# Patient Record
Sex: FEMALE | Race: BLACK OR AFRICAN AMERICAN | NOT HISPANIC OR LATINO | URBAN - METROPOLITAN AREA
[De-identification: names, ages, dates, MRNs, and addresses within clinical notes are randomized per-mention and may not be internally consistent; named-entity substitution may affect disease eponyms.]

---

## 2022-04-27 ENCOUNTER — APPOINTMENT (RX ONLY)
Dept: URBAN - METROPOLITAN AREA CLINIC 34 | Facility: CLINIC | Age: 32
Setting detail: DERMATOLOGY
End: 2022-04-27

## 2022-04-27 DIAGNOSIS — L30.0 NUMMULAR DERMATITIS: ICD-10-CM | Status: INADEQUATELY CONTROLLED

## 2022-04-27 DIAGNOSIS — L70.0 ACNE VULGARIS: ICD-10-CM | Status: INADEQUATELY CONTROLLED

## 2022-04-27 PROBLEM — L30.9 DERMATITIS, UNSPECIFIED: Status: ACTIVE | Noted: 2022-04-27

## 2022-04-27 PROBLEM — D23.61 OTHER BENIGN NEOPLASM OF SKIN OF RIGHT UPPER LIMB, INCLUDING SHOULDER: Status: ACTIVE | Noted: 2022-04-27

## 2022-04-27 PROCEDURE — ? CONSULTATION FOR EXCISION

## 2022-04-27 PROCEDURE — ? PRESCRIPTION

## 2022-04-27 PROCEDURE — ? COUNSELING

## 2022-04-27 PROCEDURE — ? ADDITIONAL NOTES

## 2022-04-27 PROCEDURE — ? PRESCRIPTION MEDICATION MANAGEMENT

## 2022-04-27 PROCEDURE — 99204 OFFICE O/P NEW MOD 45 MIN: CPT

## 2022-04-27 RX ORDER — CLINDAMYCIN PHOSPHATE AND BENZOYL PEROXIDE 12; 50 MG/G; MG/G
GEL TOPICAL QAM
Qty: 45 | Refills: 2 | Status: ERX | COMMUNITY
Start: 2022-04-27

## 2022-04-27 RX ORDER — MOMETASONE FUROATE 1 MG/G
OINTMENT TOPICAL BID
Qty: 45 | Refills: 2 | Status: ERX | COMMUNITY
Start: 2022-04-27

## 2022-04-27 RX ORDER — TRETIONIN 0.25 MG/G
CREAM TOPICAL QHS
Qty: 45 | Refills: 2 | Status: ERX | COMMUNITY
Start: 2022-04-27

## 2022-04-27 RX ORDER — SPIRONOLACTONE 100 MG/1
TABLET, FILM COATED ORAL
Qty: 90 | Refills: 0 | Status: ERX | COMMUNITY
Start: 2022-04-27

## 2022-04-27 RX ADMIN — MOMETASONE FUROATE: 1 OINTMENT TOPICAL at 00:00

## 2022-04-27 RX ADMIN — SPIRONOLACTONE: 100 TABLET, FILM COATED ORAL at 00:00

## 2022-04-27 RX ADMIN — TRETIONIN: 0.25 CREAM TOPICAL at 00:00

## 2022-04-27 RX ADMIN — CLINDAMYCIN PHOSPHATE AND BENZOYL PEROXIDE: 12; 50 GEL TOPICAL at 00:00

## 2022-04-27 ASSESSMENT — LOCATION SIMPLE DESCRIPTION DERM
LOCATION SIMPLE: LEFT CHEEK
LOCATION SIMPLE: RIGHT CHEEK
LOCATION SIMPLE: RIGHT PRETIBIAL REGION
LOCATION SIMPLE: LEFT PRETIBIAL REGION

## 2022-04-27 ASSESSMENT — LOCATION ZONE DERM
LOCATION ZONE: FACE
LOCATION ZONE: LEG

## 2022-04-27 ASSESSMENT — LOCATION DETAILED DESCRIPTION DERM
LOCATION DETAILED: RIGHT INFERIOR CENTRAL MALAR CHEEK
LOCATION DETAILED: RIGHT PROXIMAL PRETIBIAL REGION
LOCATION DETAILED: LEFT PROXIMAL PRETIBIAL REGION
LOCATION DETAILED: LEFT INFERIOR CENTRAL MALAR CHEEK

## 2022-04-27 ASSESSMENT — SEVERITY ASSESSMENT: SEVERITY: MODERATE

## 2022-04-27 NOTE — PROCEDURE: COUNSELING
Bactrim Pregnancy And Lactation Text: This medication is Pregnancy Category D and is known to cause fetal risk.  It is also excreted in breast milk.
Azelaic Acid Pregnancy And Lactation Text: This medication is considered safe during pregnancy and breast feeding.
Doxycycline Counseling:  Patient counseled regarding possible photosensitivity and increased risk for sunburn.  Patient instructed to avoid sunlight, if possible.  When exposed to sunlight, patients should wear protective clothing, sunglasses, and sunscreen.  The patient was instructed to call the office immediately if the following severe adverse effects occur:  hearing changes, easy bruising/bleeding, severe headache, or vision changes.  The patient verbalized understanding of the proper use and possible adverse effects of doxycycline.  All of the patient's questions and concerns were addressed.
Detail Level: Detailed
Topical Retinoid counseling:  Patient advised to apply a pea-sized amount only at bedtime and wait 30 minutes after washing their face before applying.  If too drying, patient may add a non-comedogenic moisturizer. The patient verbalized understanding of the proper use and possible adverse effects of retinoids.  All of the patient's questions and concerns were addressed.
Minocycline Pregnancy And Lactation Text: This medication is Pregnancy Category D and not consider safe during pregnancy. It is also excreted in breast milk.
Topical Clindamycin Counseling: Patient counseled that this medication may cause skin irritation or allergic reactions.  In the event of skin irritation, the patient was advised to reduce the amount of the drug applied or use it less frequently.   The patient verbalized understanding of the proper use and possible adverse effects of clindamycin.  All of the patient's questions and concerns were addressed.
Spironolactone Pregnancy And Lactation Text: This medication can cause feminization of the male fetus and should be avoided during pregnancy. The active metabolite is also found in breast milk.
Isotretinoin Counseling: Patient should get monthly blood tests, not donate blood, not drive at night if vision affected, not share medication, and not undergo elective surgery for 6 months after tx completed. Side effects reviewed, pt to contact office should one occur.
Doxycycline Pregnancy And Lactation Text: This medication is Pregnancy Category D and not consider safe during pregnancy. It is also excreted in breast milk but is considered safe for shorter treatment courses.
Azithromycin Counseling:  I discussed with the patient the risks of azithromycin including but not limited to GI upset, allergic reaction, drug rash, diarrhea, and yeast infections.
Topical Retinoid Pregnancy And Lactation Text: This medication is Pregnancy Category C. It is unknown if this medication is excreted in breast milk.
Sarecycline Counseling: Patient advised regarding possible photosensitivity and discoloration of the teeth, skin, lips, tongue and gums.  Patient instructed to avoid sunlight, if possible.  When exposed to sunlight, patients should wear protective clothing, sunglasses, and sunscreen.  The patient was instructed to call the office immediately if the following severe adverse effects occur:  hearing changes, easy bruising/bleeding, severe headache, or vision changes.  The patient verbalized understanding of the proper use and possible adverse effects of sarecycline.  All of the patient's questions and concerns were addressed.
Tetracycline Counseling: Patient counseled regarding possible photosensitivity and increased risk for sunburn.  Patient instructed to avoid sunlight, if possible.  When exposed to sunlight, patients should wear protective clothing, sunglasses, and sunscreen.  The patient was instructed to call the office immediately if the following severe adverse effects occur:  hearing changes, easy bruising/bleeding, severe headache, or vision changes.  The patient verbalized understanding of the proper use and possible adverse effects of tetracycline.  All of the patient's questions and concerns were addressed. Patient understands to avoid pregnancy while on therapy due to potential birth defects.
Topical Clindamycin Pregnancy And Lactation Text: This medication is Pregnancy Category B and is considered safe during pregnancy. It is unknown if it is excreted in breast milk.
Isotretinoin Pregnancy And Lactation Text: This medication is Pregnancy Category X and is considered extremely dangerous during pregnancy. It is unknown if it is excreted in breast milk.
Winlevi Counseling:  I discussed with the patient the risks of topical clascoterone including but not limited to erythema, scaling, itching, and stinging. Patient voiced their understanding.
Birth Control Pills Counseling: Birth Control Pill Counseling: I discussed with the patient the potential side effects of OCPs including but not limited to increased risk of stroke, heart attack, thrombophlebitis, deep venous thrombosis, hepatic adenomas, breast changes, GI upset, headaches, and depression.  The patient verbalized understanding of the proper use and possible adverse effects of OCPs. All of the patient's questions and concerns were addressed.
Azithromycin Pregnancy And Lactation Text: This medication is considered safe during pregnancy and is also secreted in breast milk.
High Dose Vitamin A Pregnancy And Lactation Text: High dose vitamin A therapy is contraindicated during pregnancy and breast feeding.
High Dose Vitamin A Counseling: Side effects reviewed, pt to contact office should one occur.
Topical Sulfur Applications Counseling: Topical Sulfur Counseling: Patient counseled that this medication may cause skin irritation or allergic reactions.  In the event of skin irritation, the patient was advised to reduce the amount of the drug applied or use it less frequently.   The patient verbalized understanding of the proper use and possible adverse effects of topical sulfur application.  All of the patient's questions and concerns were addressed.
Include Pregnancy/Lactation Warning?: No
Birth Control Pills Pregnancy And Lactation Text: This medication should be avoided if pregnant and for the first 30 days post-partum.
Tazorac Counseling:  Patient advised that medication is irritating and drying.  Patient may need to apply sparingly and wash off after an hour before eventually leaving it on overnight.  The patient verbalized understanding of the proper use and possible adverse effects of tazorac.  All of the patient's questions and concerns were addressed.
Winlevi Pregnancy And Lactation Text: This medication is considered safe during pregnancy and breastfeeding.
Erythromycin Counseling:  I discussed with the patient the risks of erythromycin including but not limited to GI upset, allergic reaction, drug rash, diarrhea, increase in liver enzymes, and yeast infections.
Erythromycin Pregnancy And Lactation Text: This medication is Pregnancy Category B and is considered safe during pregnancy. It is also excreted in breast milk.
Azelaic Acid Counseling: Patient counseled that medicine may cause skin irritation and to avoid applying near the eyes.  In the event of skin irritation, the patient was advised to reduce the amount of the drug applied or use it less frequently.   The patient verbalized understanding of the proper use and possible adverse effects of azelaic acid.  All of the patient's questions and concerns were addressed.
Minocycline Counseling: Patient advised regarding possible photosensitivity and discoloration of the teeth, skin, lips, tongue and gums.  Patient instructed to avoid sunlight, if possible.  When exposed to sunlight, patients should wear protective clothing, sunglasses, and sunscreen.  The patient was instructed to call the office immediately if the following severe adverse effects occur:  hearing changes, easy bruising/bleeding, severe headache, or vision changes.  The patient verbalized understanding of the proper use and possible adverse effects of minocycline.  All of the patient's questions and concerns were addressed.
Benzoyl Peroxide Pregnancy And Lactation Text: This medication is Pregnancy Category C. It is unknown if benzoyl peroxide is excreted in breast milk.
Dapsone Pregnancy And Lactation Text: This medication is Pregnancy Category C and is not considered safe during pregnancy or breast feeding.
Dapsone Counseling: I discussed with the patient the risks of dapsone including but not limited to hemolytic anemia, agranulocytosis, rashes, methemoglobinemia, kidney failure, peripheral neuropathy, headaches, GI upset, and liver toxicity.  Patients who start dapsone require monitoring including baseline LFTs and weekly CBCs for the first month, then every month thereafter.  The patient verbalized understanding of the proper use and possible adverse effects of dapsone.  All of the patient's questions and concerns were addressed.
Topical Sulfur Applications Pregnancy And Lactation Text: This medication is Pregnancy Category C and has an unknown safety profile during pregnancy. It is unknown if this topical medication is excreted in breast milk.
Benzoyl Peroxide Counseling: Patient counseled that medicine may cause skin irritation and bleach clothing.  In the event of skin irritation, the patient was advised to reduce the amount of the drug applied or use it less frequently.   The patient verbalized understanding of the proper use and possible adverse effects of benzoyl peroxide.  All of the patient's questions and concerns were addressed.
Tazorac Pregnancy And Lactation Text: This medication is not safe during pregnancy. It is unknown if this medication is excreted in breast milk.
Spironolactone Counseling: Patient advised regarding risks of diarrhea, abdominal pain, hyperkalemia, birth defects (for female patients), liver toxicity and renal toxicity. The patient may need blood work to monitor liver and kidney function and potassium levels while on therapy. The patient verbalized understanding of the proper use and possible adverse effects of spironolactone.  All of the patient's questions and concerns were addressed.
Bactrim Counseling:  I discussed with the patient the risks of sulfa antibiotics including but not limited to GI upset, allergic reaction, drug rash, diarrhea, dizziness, photosensitivity, and yeast infections.  Rarely, more serious reactions can occur including but not limited to aplastic anemia, agranulocytosis, methemoglobinemia, blood dyscrasias, liver or kidney failure, lung infiltrates or desquamative/blistering drug rashes.

## 2022-04-27 NOTE — PROCEDURE: PRESCRIPTION MEDICATION MANAGEMENT
Initiate Treatment: Clindamycin BPO qam \\nTretinoin 0.025% qhs\\nSpironolactone 100mg qd
Detail Level: Zone
Render In Strict Bullet Format?: No
Initiate Treatment: Mometasone 0.1% BID x two weeks then as needed for itching
Plan: Advised patient to refrain from scratching affected areas, can use Vaseline if not rough and bumpy.
Samples Given: Vanicream , [place] Posay

## 2022-04-28 ENCOUNTER — RX ONLY (OUTPATIENT)
Age: 32
Setting detail: RX ONLY
End: 2022-04-28

## 2022-04-28 RX ORDER — MOMETASONE FUROATE 1 MG/G
OINTMENT TOPICAL BID
Qty: 45 | Refills: 2 | Status: ERX

## 2022-04-28 RX ORDER — SPIRONOLACTONE 100 MG/1
TABLET, FILM COATED ORAL
Qty: 90 | Refills: 0 | Status: ERX

## 2022-04-28 RX ORDER — CLINDAMYCIN PHOSPHATE AND BENZOYL PEROXIDE 12; 50 MG/G; MG/G
GEL TOPICAL QAM
Qty: 45 | Refills: 2 | Status: ERX

## 2022-04-28 RX ORDER — TRETIONIN 0.25 MG/G
CREAM TOPICAL QHS
Qty: 45 | Refills: 2 | Status: ERX

## 2022-05-03 ENCOUNTER — APPOINTMENT (RX ONLY)
Dept: URBAN - METROPOLITAN AREA CLINIC 34 | Facility: CLINIC | Age: 32
Setting detail: DERMATOLOGY
End: 2022-05-03

## 2022-05-03 PROBLEM — D48.5 NEOPLASM OF UNCERTAIN BEHAVIOR OF SKIN: Status: ACTIVE | Noted: 2022-05-03

## 2022-05-03 PROCEDURE — 12032 INTMD RPR S/A/T/EXT 2.6-7.5: CPT

## 2022-05-03 PROCEDURE — 11403 EXC TR-EXT B9+MARG 2.1-3CM: CPT

## 2022-05-03 PROCEDURE — ? EXCISION

## 2022-05-03 PROCEDURE — ? ADDITIONAL NOTES

## 2022-05-03 NOTE — PROCEDURE: EXCISION
Medical Necessity Information: It is in your best interest to select a reason for this procedure from the list below. All of these items fulfill various CMS LCD requirements except lesion extends to a margin.
Include Z78.9 (Other Specified Conditions Influencing Health Status) As An Associated Diagnosis?: No
Medical Necessity Clause: This procedure was medically necessary because the lesion that was treated was:
Lab: 6
Lab Facility: 3
Size Of Lesion In Cm: 1.7
Size Of Margin In Cm: 0.2
Eye Clamp Note Details: An eye clamp was used during the procedure.
Excision Method: Elliptical
Saucerization Depth: dermis and superficial adipose tissue
Repair Type: Intermediate
Suturegard Retention Suture: 2-0 Nylon
Retention Suture Bite Size: 3 mm
Length To Time In Minutes Device Was In Place: 10
Number Of Hemigard Strips Per Side: 1
Intermediate / Complex Repair - Final Wound Length In Cm: 3.4
Undermining Type: Entire Wound
Debridement Text: The wound edges were debrided prior to proceeding with the closure to facilitate wound healing.
Helical Rim Text: The closure involved the helical rim.
Vermilion Border Text: The closure involved the vermilion border.
Nostril Rim Text: The closure involved the nostril rim.
Retention Suture Text: Retention sutures were placed to support the closure and prevent dehiscence.
Primary Defect Length (In Cm): 0
Suture Removal: 14 days
Epidermal Closure Graft Donor Site (Optional): simple interrupted
Graft Donor Site Bandage (Optional-Leave Blank If You Don't Want In Note): Steri-strips and a pressure bandage were applied to the donor site.
Detail Level: Detailed
Excision Depth: adipose tissue
Scalpel Size: 15 blade
Anesthesia Type: 1% lidocaine with epinephrine
Hemostasis: Electrocautery
Estimated Blood Loss (Cc): minimal
Deep Sutures: 4-0 PDS
Epidermal Sutures: 4-0 Prolene
Epidermal Closure: subcuticular
Wound Care: Petrolatum
Dressing: dry sterile dressing
Suturegard Intro: Intraoperative tissue expansion was performed, utilizing the SUTUREGARD device, in order to reduce wound tension.
Suturegard Body: The suture ends were repeatedly re-tightened and re-clamped to achieve the desired tissue expansion.
Hemigard Intro: Due to skin fragility and wound tension, it was decided to use HEMIGARD adhesive retention suture devices to permit a linear closure. The skin was cleaned and dried for a 6cm distance away from the wound. Excessive hair, if present, was removed to allow for adhesion.
Hemigard Postcare Instructions: The HEMIGARD strips are to remain completely dry for at least 5-7 days.
Positioning (Leave Blank If You Do Not Want): The patient was placed in a comfortable position exposing the surgical site.
Complex Repair Preamble Text (Leave Blank If You Do Not Want): Extensive wide undermining was performed.
Intermediate Repair Preamble Text (Leave Blank If You Do Not Want): Undermining was performed with blunt dissection.
Fusiform Excision Additional Text (Leave Blank If You Do Not Want): The margin was drawn around the clinically apparent lesion.  A fusiform shape was then drawn on the skin incorporating the lesion and margins.  Incisions were then made along these lines to the appropriate tissue plane and the lesion was extirpated.
Eliptical Excision Additional Text (Leave Blank If You Do Not Want): The margin was drawn around the clinically apparent lesion.  An elliptical shape was then drawn on the skin incorporating the lesion and margins.  Incisions were then made along these lines to the appropriate tissue plane and the lesion was extirpated.
Saucerization Excision Additional Text (Leave Blank If You Do Not Want): The margin was drawn around the clinically apparent lesion.  Incisions were then made along these lines, in a tangential fashion, to the appropriate tissue plane and the lesion was extirpated.
Slit Excision Additional Text (Leave Blank If You Do Not Want): A linear line was drawn on the skin overlying the lesion. An incision was made slowly until the lesion was visualized.  Once visualized, the lesion was removed with blunt dissection.
Excisional Biopsy Additional Text (Leave Blank If You Do Not Want): The margin was drawn around the clinically apparent lesion. An elliptical shape was then drawn on the skin incorporating the lesion and margins.  Incisions were then made along these lines to the appropriate tissue plane and the lesion was extirpated.
Perilesional Excision Additional Text (Leave Blank If You Do Not Want): The margin was drawn around the clinically apparent lesion. Incisions were then made along these lines to the appropriate tissue plane and the lesion was extirpated.
Repair Performed By Another Provider Text (Leave Blank If You Do Not Want): After the tissue was excised the defect was repaired by another provider.
No Repair - Repaired With Adjacent Surgical Defect Text (Leave Blank If You Do Not Want): After the excision the defect was repaired concurrently with another surgical defect which was in close approximation.
Adjacent Tissue Transfer Text: The defect edges were debeveled with a #15 scalpel blade.  Given the location of the defect and the proximity to free margins an adjacent tissue transfer was deemed most appropriate.  Using a sterile surgical marker, an appropriate flap was drawn incorporating the defect and placing the expected incisions within the relaxed skin tension lines where possible.    The area thus outlined was incised deep to adipose tissue with a #15 scalpel blade.  The skin margins were undermined to an appropriate distance in all directions utilizing iris scissors.
Advancement Flap (Single) Text: The defect edges were debeveled with a #15 scalpel blade.  Given the location of the defect and the proximity to free margins a single advancement flap was deemed most appropriate.  Using a sterile surgical marker, an appropriate advancement flap was drawn incorporating the defect and placing the expected incisions within the relaxed skin tension lines where possible.    The area thus outlined was incised deep to adipose tissue with a #15 scalpel blade.  The skin margins were undermined to an appropriate distance in all directions utilizing iris scissors.
Advancement Flap (Double) Text: The defect edges were debeveled with a #15 scalpel blade.  Given the location of the defect and the proximity to free margins a double advancement flap was deemed most appropriate.  Using a sterile surgical marker, the appropriate advancement flaps were drawn incorporating the defect and placing the expected incisions within the relaxed skin tension lines where possible.    The area thus outlined was incised deep to adipose tissue with a #15 scalpel blade.  The skin margins were undermined to an appropriate distance in all directions utilizing iris scissors.
Burow's Advancement Flap Text: The defect edges were debeveled with a #15 scalpel blade.  Given the location of the defect and the proximity to free margins a Burow's advancement flap was deemed most appropriate.  Using a sterile surgical marker, the appropriate advancement flap was drawn incorporating the defect and placing the expected incisions within the relaxed skin tension lines where possible.    The area thus outlined was incised deep to adipose tissue with a #15 scalpel blade.  The skin margins were undermined to an appropriate distance in all directions utilizing iris scissors.
Chonodrocutaneous Helical Advancement Flap Text: The defect edges were debeveled with a #15 scalpel blade.  Given the location of the defect and the proximity to free margins a chondrocutaneous helical advancement flap was deemed most appropriate.  Using a sterile surgical marker, the appropriate advancement flap was drawn incorporating the defect and placing the expected incisions within the relaxed skin tension lines where possible.    The area thus outlined was incised deep to adipose tissue with a #15 scalpel blade.  The skin margins were undermined to an appropriate distance in all directions utilizing iris scissors.
Crescentic Advancement Flap Text: The defect edges were debeveled with a #15 scalpel blade.  Given the location of the defect and the proximity to free margins a crescentic advancement flap was deemed most appropriate.  Using a sterile surgical marker, the appropriate advancement flap was drawn incorporating the defect and placing the expected incisions within the relaxed skin tension lines where possible.    The area thus outlined was incised deep to adipose tissue with a #15 scalpel blade.  The skin margins were undermined to an appropriate distance in all directions utilizing iris scissors.
A-T Advancement Flap Text: The defect edges were debeveled with a #15 scalpel blade.  Given the location of the defect, shape of the defect and the proximity to free margins an A-T advancement flap was deemed most appropriate.  Using a sterile surgical marker, an appropriate advancement flap was drawn incorporating the defect and placing the expected incisions within the relaxed skin tension lines where possible.    The area thus outlined was incised deep to adipose tissue with a #15 scalpel blade.  The skin margins were undermined to an appropriate distance in all directions utilizing iris scissors.
O-T Advancement Flap Text: The defect edges were debeveled with a #15 scalpel blade.  Given the location of the defect, shape of the defect and the proximity to free margins an O-T advancement flap was deemed most appropriate.  Using a sterile surgical marker, an appropriate advancement flap was drawn incorporating the defect and placing the expected incisions within the relaxed skin tension lines where possible.    The area thus outlined was incised deep to adipose tissue with a #15 scalpel blade.  The skin margins were undermined to an appropriate distance in all directions utilizing iris scissors.
O-L Flap Text: The defect edges were debeveled with a #15 scalpel blade.  Given the location of the defect, shape of the defect and the proximity to free margins an O-L flap was deemed most appropriate.  Using a sterile surgical marker, an appropriate advancement flap was drawn incorporating the defect and placing the expected incisions within the relaxed skin tension lines where possible.    The area thus outlined was incised deep to adipose tissue with a #15 scalpel blade.  The skin margins were undermined to an appropriate distance in all directions utilizing iris scissors.
O-Z Flap Text: The defect edges were debeveled with a #15 scalpel blade.  Given the location of the defect, shape of the defect and the proximity to free margins an O-Z flap was deemed most appropriate.  Using a sterile surgical marker, an appropriate transposition flap was drawn incorporating the defect and placing the expected incisions within the relaxed skin tension lines where possible. The area thus outlined was incised deep to adipose tissue with a #15 scalpel blade.  The skin margins were undermined to an appropriate distance in all directions utilizing iris scissors.
Double O-Z Flap Text: The defect edges were debeveled with a #15 scalpel blade.  Given the location of the defect, shape of the defect and the proximity to free margins a Double O-Z flap was deemed most appropriate.  Using a sterile surgical marker, an appropriate transposition flap was drawn incorporating the defect and placing the expected incisions within the relaxed skin tension lines where possible. The area thus outlined was incised deep to adipose tissue with a #15 scalpel blade.  The skin margins were undermined to an appropriate distance in all directions utilizing iris scissors.
V-Y Flap Text: The defect edges were debeveled with a #15 scalpel blade.  Given the location of the defect, shape of the defect and the proximity to free margins a V-Y flap was deemed most appropriate.  Using a sterile surgical marker, an appropriate advancement flap was drawn incorporating the defect and placing the expected incisions within the relaxed skin tension lines where possible.    The area thus outlined was incised deep to adipose tissue with a #15 scalpel blade.  The skin margins were undermined to an appropriate distance in all directions utilizing iris scissors.
Advancement-Rotation Flap Text: The defect edges were debeveled with a #15 scalpel blade.  Given the location of the defect, shape of the defect and the proximity to free margins an advancement-rotation flap was deemed most appropriate.  Using a sterile surgical marker, an appropriate flap was drawn incorporating the defect and placing the expected incisions within the relaxed skin tension lines where possible. The area thus outlined was incised deep to adipose tissue with a #15 scalpel blade.  The skin margins were undermined to an appropriate distance in all directions utilizing iris scissors.
Mercedes Flap Text: The defect edges were debeveled with a #15 scalpel blade.  Given the location of the defect, shape of the defect and the proximity to free margins a Mercedes flap was deemed most appropriate.  Using a sterile surgical marker, an appropriate advancement flap was drawn incorporating the defect and placing the expected incisions within the relaxed skin tension lines where possible. The area thus outlined was incised deep to adipose tissue with a #15 scalpel blade.  The skin margins were undermined to an appropriate distance in all directions utilizing iris scissors.
Modified Advancement Flap Text: The defect edges were debeveled with a #15 scalpel blade.  Given the location of the defect, shape of the defect and the proximity to free margins a modified advancement flap was deemed most appropriate.  Using a sterile surgical marker, an appropriate advancement flap was drawn incorporating the defect and placing the expected incisions within the relaxed skin tension lines where possible.    The area thus outlined was incised deep to adipose tissue with a #15 scalpel blade.  The skin margins were undermined to an appropriate distance in all directions utilizing iris scissors.
Mucosal Advancement Flap Text: Given the location of the defect, shape of the defect and the proximity to free margins a mucosal advancement flap was deemed most appropriate. Incisions were made with a 15 blade scalpel in the appropriate fashion along the cutaneous vermilion border and the mucosal lip. The remaining actinically damaged mucosal tissue was excised.  The mucosal advancement flap was then elevated to the gingival sulcus with care taken to preserve the neurovascular structures and advanced into the primary defect. Care was taken to ensure that precise realignment of the vermilion border was achieved.
Peng Advancement Flap Text: The defect edges were debeveled with a #15 scalpel blade.  Given the location of the defect, shape of the defect and the proximity to free margins a Peng advancement flap was deemed most appropriate.  Using a sterile surgical marker, an appropriate advancement flap was drawn incorporating the defect and placing the expected incisions within the relaxed skin tension lines where possible. The area thus outlined was incised deep to adipose tissue with a #15 scalpel blade.  The skin margins were undermined to an appropriate distance in all directions utilizing iris scissors.
Hatchet Flap Text: The defect edges were debeveled with a #15 scalpel blade.  Given the location of the defect, shape of the defect and the proximity to free margins a hatchet flap was deemed most appropriate.  Using a sterile surgical marker, an appropriate hatchet flap was drawn incorporating the defect and placing the expected incisions within the relaxed skin tension lines where possible.    The area thus outlined was incised deep to adipose tissue with a #15 scalpel blade.  The skin margins were undermined to an appropriate distance in all directions utilizing iris scissors.
Rotation Flap Text: The defect edges were debeveled with a #15 scalpel blade.  Given the location of the defect, shape of the defect and the proximity to free margins a rotation flap was deemed most appropriate.  Using a sterile surgical marker, an appropriate rotation flap was drawn incorporating the defect and placing the expected incisions within the relaxed skin tension lines where possible.    The area thus outlined was incised deep to adipose tissue with a #15 scalpel blade.  The skin margins were undermined to an appropriate distance in all directions utilizing iris scissors.
Spiral Flap Text: The defect edges were debeveled with a #15 scalpel blade.  Given the location of the defect, shape of the defect and the proximity to free margins a spiral flap was deemed most appropriate.  Using a sterile surgical marker, an appropriate rotation flap was drawn incorporating the defect and placing the expected incisions within the relaxed skin tension lines where possible. The area thus outlined was incised deep to adipose tissue with a #15 scalpel blade.  The skin margins were undermined to an appropriate distance in all directions utilizing iris scissors.
Staged Advancement Flap Text: The defect edges were debeveled with a #15 scalpel blade.  Given the location of the defect, shape of the defect and the proximity to free margins a staged advancement flap was deemed most appropriate.  Using a sterile surgical marker, an appropriate advancement flap was drawn incorporating the defect and placing the expected incisions within the relaxed skin tension lines where possible. The area thus outlined was incised deep to adipose tissue with a #15 scalpel blade.  The skin margins were undermined to an appropriate distance in all directions utilizing iris scissors.
Star Wedge Flap Text: The defect edges were debeveled with a #15 scalpel blade.  Given the location of the defect, shape of the defect and the proximity to free margins a star wedge flap was deemed most appropriate.  Using a sterile surgical marker, an appropriate rotation flap was drawn incorporating the defect and placing the expected incisions within the relaxed skin tension lines where possible. The area thus outlined was incised deep to adipose tissue with a #15 scalpel blade.  The skin margins were undermined to an appropriate distance in all directions utilizing iris scissors.
Transposition Flap Text: The defect edges were debeveled with a #15 scalpel blade.  Given the location of the defect and the proximity to free margins a transposition flap was deemed most appropriate.  Using a sterile surgical marker, an appropriate transposition flap was drawn incorporating the defect.    The area thus outlined was incised deep to adipose tissue with a #15 scalpel blade.  The skin margins were undermined to an appropriate distance in all directions utilizing iris scissors.
Muscle Hinge Flap Text: The defect edges were debeveled with a #15 scalpel blade.  Given the size, depth and location of the defect and the proximity to free margins a muscle hinge flap was deemed most appropriate.  Using a sterile surgical marker, an appropriate hinge flap was drawn incorporating the defect. The area thus outlined was incised with a #15 scalpel blade.  The skin margins were undermined to an appropriate distance in all directions utilizing iris scissors.
Mustarde Flap Text: The defect edges were debeveled with a #15 scalpel blade.  Given the size, depth and location of the defect and the proximity to free margins a Mustarde flap was deemed most appropriate.  Using a sterile surgical marker, an appropriate flap was drawn incorporating the defect. The area thus outlined was incised with a #15 scalpel blade.  The skin margins were undermined to an appropriate distance in all directions utilizing iris scissors.
Nasal Turnover Hinge Flap Text: The defect edges were debeveled with a #15 scalpel blade.  Given the size, depth, location of the defect and the defect being full thickness a nasal turnover hinge flap was deemed most appropriate.  Using a sterile surgical marker, an appropriate hinge flap was drawn incorporating the defect. The area thus outlined was incised with a #15 scalpel blade. The flap was designed to recreate the nasal mucosal lining and the alar rim. The skin margins were undermined to an appropriate distance in all directions utilizing iris scissors.
Nasalis-Muscle-Based Myocutaneous Island Pedicle Flap Text: Using a #15 blade, an incision was made around the donor flap to the level of the nasalis muscle. Wide lateral undermining was then performed in both the subcutaneous plane above the nasalis muscle, and in a submuscular plane just above periosteum. This allowed the formation of a free nasalis muscle axial pedicle (based on the angular artery) which was still attached to the actual cutaneous flap, increasing its mobility and vascular viability. Hemostasis was obtained with pinpoint electrocoagulation. The flap was mobilized into position and the pivotal anchor points positioned and stabilized with buried interrupted sutures. Subcutaneous and dermal tissues were closed in a multilayered fashion with sutures. Tissue redundancies were excised, and the epidermal edges were apposed without significant tension and sutured with sutures.
Orbicularis Oris Muscle Flap Text: The defect edges were debeveled with a #15 scalpel blade.  Given that the defect affected the competency of the oral sphincter an orbicularis oris muscle flap was deemed most appropriate to restore this competency and normal muscle function.  Using a sterile surgical marker, an appropriate flap was drawn incorporating the defect. The area thus outlined was incised with a #15 scalpel blade.
Melolabial Transposition Flap Text: The defect edges were debeveled with a #15 scalpel blade.  Given the location of the defect and the proximity to free margins a melolabial flap was deemed most appropriate.  Using a sterile surgical marker, an appropriate melolabial transposition flap was drawn incorporating the defect.    The area thus outlined was incised deep to adipose tissue with a #15 scalpel blade.  The skin margins were undermined to an appropriate distance in all directions utilizing iris scissors.
Rhombic Flap Text: The defect edges were debeveled with a #15 scalpel blade.  Given the location of the defect and the proximity to free margins a rhombic flap was deemed most appropriate.  Using a sterile surgical marker, an appropriate rhombic flap was drawn incorporating the defect.    The area thus outlined was incised deep to adipose tissue with a #15 scalpel blade.  The skin margins were undermined to an appropriate distance in all directions utilizing iris scissors.
Rhomboid Transposition Flap Text: The defect edges were debeveled with a #15 scalpel blade.  Given the location of the defect and the proximity to free margins a rhomboid transposition flap was deemed most appropriate.  Using a sterile surgical marker, an appropriate rhomboid flap was drawn incorporating the defect.    The area thus outlined was incised deep to adipose tissue with a #15 scalpel blade.  The skin margins were undermined to an appropriate distance in all directions utilizing iris scissors.
Bi-Rhombic Flap Text: The defect edges were debeveled with a #15 scalpel blade.  Given the location of the defect and the proximity to free margins a bi-rhombic flap was deemed most appropriate.  Using a sterile surgical marker, an appropriate rhombic flap was drawn incorporating the defect. The area thus outlined was incised deep to adipose tissue with a #15 scalpel blade.  The skin margins were undermined to an appropriate distance in all directions utilizing iris scissors.
Helical Rim Advancement Flap Text: The defect edges were debeveled with a #15 blade scalpel.  Given the location of the defect and the proximity to free margins (helical rim) a double helical rim advancement flap was deemed most appropriate.  Using a sterile surgical marker, the appropriate advancement flaps were drawn incorporating the defect and placing the expected incisions between the helical rim and antihelix where possible.  The area thus outlined was incised through and through with a #15 scalpel blade.  With a skin hook and iris scissors, the flaps were gently and sharply undermined and freed up.
Bilateral Helical Rim Advancement Flap Text: The defect edges were debeveled with a #15 blade scalpel.  Given the location of the defect and the proximity to free margins (helical rim) a bilateral helical rim advancement flap was deemed most appropriate.  Using a sterile surgical marker, the appropriate advancement flaps were drawn incorporating the defect and placing the expected incisions between the helical rim and antihelix where possible.  The area thus outlined was incised through and through with a #15 scalpel blade.  With a skin hook and iris scissors, the flaps were gently and sharply undermined and freed up.
Ear Star Wedge Flap Text: The defect edges were debeveled with a #15 blade scalpel.  Given the location of the defect and the proximity to free margins (helical rim) an ear star wedge flap was deemed most appropriate.  Using a sterile surgical marker, the appropriate flap was drawn incorporating the defect and placing the expected incisions between the helical rim and antihelix where possible.  The area thus outlined was incised through and through with a #15 scalpel blade.
Banner Transposition Flap Text: The defect edges were debeveled with a #15 scalpel blade.  Given the location of the defect and the proximity to free margins a Banner transposition flap was deemed most appropriate.  Using a sterile surgical marker, an appropriate flap drawn around the defect. The area thus outlined was incised deep to adipose tissue with a #15 scalpel blade.  The skin margins were undermined to an appropriate distance in all directions utilizing iris scissors.
Bilobed Flap Text: The defect edges were debeveled with a #15 scalpel blade.  Given the location of the defect and the proximity to free margins a bilobe flap was deemed most appropriate.  Using a sterile surgical marker, an appropriate bilobe flap drawn around the defect.    The area thus outlined was incised deep to adipose tissue with a #15 scalpel blade.  The skin margins were undermined to an appropriate distance in all directions utilizing iris scissors.
Bilobed Transposition Flap Text: The defect edges were debeveled with a #15 scalpel blade.  Given the location of the defect and the proximity to free margins a bilobed transposition flap was deemed most appropriate.  Using a sterile surgical marker, an appropriate bilobe flap drawn around the defect.    The area thus outlined was incised deep to adipose tissue with a #15 scalpel blade.  The skin margins were undermined to an appropriate distance in all directions utilizing iris scissors.
Trilobed Flap Text: The defect edges were debeveled with a #15 scalpel blade.  Given the location of the defect and the proximity to free margins a trilobed flap was deemed most appropriate.  Using a sterile surgical marker, an appropriate trilobed flap drawn around the defect.    The area thus outlined was incised deep to adipose tissue with a #15 scalpel blade.  The skin margins were undermined to an appropriate distance in all directions utilizing iris scissors.
Dorsal Nasal Flap Text: The defect edges were debeveled with a #15 scalpel blade.  Given the location of the defect and the proximity to free margins a dorsal nasal flap was deemed most appropriate.  Using a sterile surgical marker, an appropriate dorsal nasal flap was drawn around the defect.    The area thus outlined was incised deep to adipose tissue with a #15 scalpel blade.  The skin margins were undermined to an appropriate distance in all directions utilizing iris scissors.
Island Pedicle Flap Text: The defect edges were debeveled with a #15 scalpel blade.  Given the location of the defect, shape of the defect and the proximity to free margins an island pedicle advancement flap was deemed most appropriate.  Using a sterile surgical marker, an appropriate advancement flap was drawn incorporating the defect, outlining the appropriate donor tissue and placing the expected incisions within the relaxed skin tension lines where possible.    The area thus outlined was incised deep to adipose tissue with a #15 scalpel blade.  The skin margins were undermined to an appropriate distance in all directions around the primary defect and laterally outward around the island pedicle utilizing iris scissors.  There was minimal undermining beneath the pedicle flap.
Island Pedicle Flap With Canthal Suspension Text: The defect edges were debeveled with a #15 scalpel blade.  Given the location of the defect, shape of the defect and the proximity to free margins an island pedicle advancement flap was deemed most appropriate.  Using a sterile surgical marker, an appropriate advancement flap was drawn incorporating the defect, outlining the appropriate donor tissue and placing the expected incisions within the relaxed skin tension lines where possible. The area thus outlined was incised deep to adipose tissue with a #15 scalpel blade.  The skin margins were undermined to an appropriate distance in all directions around the primary defect and laterally outward around the island pedicle utilizing iris scissors.  There was minimal undermining beneath the pedicle flap. A suspension suture was placed in the canthal tendon to prevent tension and prevent ectropion.
Alar Island Pedicle Flap Text: The defect edges were debeveled with a #15 scalpel blade.  Given the location of the defect, shape of the defect and the proximity to the alar rim an island pedicle advancement flap was deemed most appropriate.  Using a sterile surgical marker, an appropriate advancement flap was drawn incorporating the defect, outlining the appropriate donor tissue and placing the expected incisions within the nasal ala running parallel to the alar rim. The area thus outlined was incised with a #15 scalpel blade.  The skin margins were undermined minimally to an appropriate distance in all directions around the primary defect and laterally outward around the island pedicle utilizing iris scissors.  There was minimal undermining beneath the pedicle flap.
Double Island Pedicle Flap Text: The defect edges were debeveled with a #15 scalpel blade.  Given the location of the defect, shape of the defect and the proximity to free margins a double island pedicle advancement flap was deemed most appropriate.  Using a sterile surgical marker, an appropriate advancement flap was drawn incorporating the defect, outlining the appropriate donor tissue and placing the expected incisions within the relaxed skin tension lines where possible.    The area thus outlined was incised deep to adipose tissue with a #15 scalpel blade.  The skin margins were undermined to an appropriate distance in all directions around the primary defect and laterally outward around the island pedicle utilizing iris scissors.  There was minimal undermining beneath the pedicle flap.
Island Pedicle Flap-Requiring Vessel Identification Text: The defect edges were debeveled with a #15 scalpel blade.  Given the location of the defect, shape of the defect and the proximity to free margins an island pedicle advancement flap was deemed most appropriate.  Using a sterile surgical marker, an appropriate advancement flap was drawn, based on the axial vessel mentioned above, incorporating the defect, outlining the appropriate donor tissue and placing the expected incisions within the relaxed skin tension lines where possible.    The area thus outlined was incised deep to adipose tissue with a #15 scalpel blade.  The skin margins were undermined to an appropriate distance in all directions around the primary defect and laterally outward around the island pedicle utilizing iris scissors.  There was minimal undermining beneath the pedicle flap.
Keystone Flap Text: The defect edges were debeveled with a #15 scalpel blade.  Given the location of the defect, shape of the defect a keystone flap was deemed most appropriate.  Using a sterile surgical marker, an appropriate keystone flap was drawn incorporating the defect, outlining the appropriate donor tissue and placing the expected incisions within the relaxed skin tension lines where possible. The area thus outlined was incised deep to adipose tissue with a #15 scalpel blade.  The skin margins were undermined to an appropriate distance in all directions around the primary defect and laterally outward around the flap utilizing iris scissors.
O-T Plasty Text: The defect edges were debeveled with a #15 scalpel blade.  Given the location of the defect, shape of the defect and the proximity to free margins an O-T plasty was deemed most appropriate.  Using a sterile surgical marker, an appropriate O-T plasty was drawn incorporating the defect and placing the expected incisions within the relaxed skin tension lines where possible.    The area thus outlined was incised deep to adipose tissue with a #15 scalpel blade.  The skin margins were undermined to an appropriate distance in all directions utilizing iris scissors.
O-Z Plasty Text: The defect edges were debeveled with a #15 scalpel blade.  Given the location of the defect, shape of the defect and the proximity to free margins an O-Z plasty (double transposition flap) was deemed most appropriate.  Using a sterile surgical marker, the appropriate transposition flaps were drawn incorporating the defect and placing the expected incisions within the relaxed skin tension lines where possible.    The area thus outlined was incised deep to adipose tissue with a #15 scalpel blade.  The skin margins were undermined to an appropriate distance in all directions utilizing iris scissors.  Hemostasis was achieved with electrocautery.  The flaps were then transposed into place, one clockwise and the other counterclockwise, and anchored with interrupted buried subcutaneous sutures.
Double O-Z Plasty Text: The defect edges were debeveled with a #15 scalpel blade.  Given the location of the defect, shape of the defect and the proximity to free margins a Double O-Z plasty (double transposition flap) was deemed most appropriate.  Using a sterile surgical marker, the appropriate transposition flaps were drawn incorporating the defect and placing the expected incisions within the relaxed skin tension lines where possible. The area thus outlined was incised deep to adipose tissue with a #15 scalpel blade.  The skin margins were undermined to an appropriate distance in all directions utilizing iris scissors.  Hemostasis was achieved with electrocautery.  The flaps were then transposed into place, one clockwise and the other counterclockwise, and anchored with interrupted buried subcutaneous sutures.
V-Y Plasty Text: The defect edges were debeveled with a #15 scalpel blade.  Given the location of the defect, shape of the defect and the proximity to free margins an V-Y advancement flap was deemed most appropriate.  Using a sterile surgical marker, an appropriate advancement flap was drawn incorporating the defect and placing the expected incisions within the relaxed skin tension lines where possible.    The area thus outlined was incised deep to adipose tissue with a #15 scalpel blade.  The skin margins were undermined to an appropriate distance in all directions utilizing iris scissors.
H Plasty Text: Given the location of the defect, shape of the defect and the proximity to free margins a H-plasty was deemed most appropriate for repair.  Using a sterile surgical marker, the appropriate advancement arms of the H-plasty were drawn incorporating the defect and placing the expected incisions within the relaxed skin tension lines where possible. The area thus outlined was incised deep to adipose tissue with a #15 scalpel blade. The skin margins were undermined to an appropriate distance in all directions utilizing iris scissors.  The opposing advancement arms were then advanced into place in opposite direction and anchored with interrupted buried subcutaneous sutures.
W Plasty Text: The lesion was extirpated to the level of the fat with a #15 scalpel blade.  Given the location of the defect, shape of the defect and the proximity to free margins a W-plasty was deemed most appropriate for repair.  Using a sterile surgical marker, the appropriate transposition arms of the W-plasty were drawn incorporating the defect and placing the expected incisions within the relaxed skin tension lines where possible.    The area thus outlined was incised deep to adipose tissue with a #15 scalpel blade.  The skin margins were undermined to an appropriate distance in all directions utilizing iris scissors.  The opposing transposition arms were then transposed into place in opposite direction and anchored with interrupted buried subcutaneous sutures.
Z Plasty Text: The lesion was extirpated to the level of the fat with a #15 scalpel blade.  Given the location of the defect, shape of the defect and the proximity to free margins a Z-plasty was deemed most appropriate for repair.  Using a sterile surgical marker, the appropriate transposition arms of the Z-plasty were drawn incorporating the defect and placing the expected incisions within the relaxed skin tension lines where possible.    The area thus outlined was incised deep to adipose tissue with a #15 scalpel blade.  The skin margins were undermined to an appropriate distance in all directions utilizing iris scissors.  The opposing transposition arms were then transposed into place in opposite direction and anchored with interrupted buried subcutaneous sutures.
Zygomaticofacial Flap Text: Given the location of the defect, shape of the defect and the proximity to free margins a zygomaticofacial flap was deemed most appropriate for repair.  Using a sterile surgical marker, the appropriate flap was drawn incorporating the defect and placing the expected incisions within the relaxed skin tension lines where possible. The area thus outlined was incised deep to adipose tissue with a #15 scalpel blade with preservation of a vascular pedicle.  The skin margins were undermined to an appropriate distance in all directions utilizing iris scissors.  The flap was then placed into the defect and anchored with interrupted buried subcutaneous sutures.
Cheek Interpolation Flap Text: A decision was made to reconstruct the defect utilizing an interpolation axial flap and a staged reconstruction.  A telfa template was made of the defect.  This telfa template was then used to outline the Cheek Interpolation flap.  The donor area for the pedicle flap was then injected with anesthesia.  The flap was excised through the skin and subcutaneous tissue down to the layer of the underlying musculature.  The interpolation flap was carefully excised within this deep plane to maintain its blood supply.  The edges of the donor site were undermined.   The donor site was closed in a primary fashion.  The pedicle was then rotated into position and sutured.  Once the tube was sutured into place, adequate blood supply was confirmed with blanching and refill.  The pedicle was then wrapped with xeroform gauze and dressed appropriately with a telfa and gauze bandage to ensure continued blood supply and protect the attached pedicle.
Cheek-To-Nose Interpolation Flap Text: A decision was made to reconstruct the defect utilizing an interpolation axial flap and a staged reconstruction.  A telfa template was made of the defect.  This telfa template was then used to outline the Cheek-To-Nose Interpolation flap.  The donor area for the pedicle flap was then injected with anesthesia.  The flap was excised through the skin and subcutaneous tissue down to the layer of the underlying musculature.  The interpolation flap was carefully excised within this deep plane to maintain its blood supply.  The edges of the donor site were undermined.   The donor site was closed in a primary fashion.  The pedicle was then rotated into position and sutured.  Once the tube was sutured into place, adequate blood supply was confirmed with blanching and refill.  The pedicle was then wrapped with xeroform gauze and dressed appropriately with a telfa and gauze bandage to ensure continued blood supply and protect the attached pedicle.
Interpolation Flap Text: A decision was made to reconstruct the defect utilizing an interpolation axial flap and a staged reconstruction.  A telfa template was made of the defect.  This telfa template was then used to outline the interpolation flap.  The donor area for the pedicle flap was then injected with anesthesia.  The flap was excised through the skin and subcutaneous tissue down to the layer of the underlying musculature.  The interpolation flap was carefully excised within this deep plane to maintain its blood supply.  The edges of the donor site were undermined.   The donor site was closed in a primary fashion.  The pedicle was then rotated into position and sutured.  Once the tube was sutured into place, adequate blood supply was confirmed with blanching and refill.  The pedicle was then wrapped with xeroform gauze and dressed appropriately with a telfa and gauze bandage to ensure continued blood supply and protect the attached pedicle.
Melolabial Interpolation Flap Text: A decision was made to reconstruct the defect utilizing an interpolation axial flap and a staged reconstruction.  A telfa template was made of the defect.  This telfa template was then used to outline the melolabial interpolation flap.  The donor area for the pedicle flap was then injected with anesthesia.  The flap was excised through the skin and subcutaneous tissue down to the layer of the underlying musculature.  The pedicle flap was carefully excised within this deep plane to maintain its blood supply.  The edges of the donor site were undermined.   The donor site was closed in a primary fashion.  The pedicle was then rotated into position and sutured.  Once the tube was sutured into place, adequate blood supply was confirmed with blanching and refill.  The pedicle was then wrapped with xeroform gauze and dressed appropriately with a telfa and gauze bandage to ensure continued blood supply and protect the attached pedicle.
Mastoid Interpolation Flap Text: A decision was made to reconstruct the defect utilizing an interpolation axial flap and a staged reconstruction.  A telfa template was made of the defect.  This telfa template was then used to outline the mastoid interpolation flap.  The donor area for the pedicle flap was then injected with anesthesia.  The flap was excised through the skin and subcutaneous tissue down to the layer of the underlying musculature.  The pedicle flap was carefully excised within this deep plane to maintain its blood supply.  The edges of the donor site were undermined.   The donor site was closed in a primary fashion.  The pedicle was then rotated into position and sutured.  Once the tube was sutured into place, adequate blood supply was confirmed with blanching and refill.  The pedicle was then wrapped with xeroform gauze and dressed appropriately with a telfa and gauze bandage to ensure continued blood supply and protect the attached pedicle.
Posterior Auricular Interpolation Flap Text: A decision was made to reconstruct the defect utilizing an interpolation axial flap and a staged reconstruction.  A telfa template was made of the defect.  This telfa template was then used to outline the posterior auricular interpolation flap.  The donor area for the pedicle flap was then injected with anesthesia.  The flap was excised through the skin and subcutaneous tissue down to the layer of the underlying musculature.  The pedicle flap was carefully excised within this deep plane to maintain its blood supply.  The edges of the donor site were undermined.   The donor site was closed in a primary fashion.  The pedicle was then rotated into position and sutured.  Once the tube was sutured into place, adequate blood supply was confirmed with blanching and refill.  The pedicle was then wrapped with xeroform gauze and dressed appropriately with a telfa and gauze bandage to ensure continued blood supply and protect the attached pedicle.
Paramedian Forehead Flap Text: A decision was made to reconstruct the defect utilizing an interpolation axial flap and a staged reconstruction.  A telfa template was made of the defect.  This telfa template was then used to outline the paramedian forehead pedicle flap.  The donor area for the pedicle flap was then injected with anesthesia.  The flap was excised through the skin and subcutaneous tissue down to the layer of the underlying musculature.  The pedicle flap was carefully excised within this deep plane to maintain its blood supply.  The edges of the donor site were undermined.   The donor site was closed in a primary fashion.  The pedicle was then rotated into position and sutured.  Once the tube was sutured into place, adequate blood supply was confirmed with blanching and refill.  The pedicle was then wrapped with xeroform gauze and dressed appropriately with a telfa and gauze bandage to ensure continued blood supply and protect the attached pedicle.
Lip Wedge Excision Repair Text: Given the location of the defect and the proximity to free margins a full thickness wedge repair was deemed most appropriate.  Using a sterile surgical marker, the appropriate repair was drawn incorporating the defect and placing the expected incisions perpendicular to the vermilion border.  The vermilion border was also meticulously outlined to ensure appropriate reapproximation during the repair.  The area thus outlined was incised through and through with a #15 scalpel blade.  The muscularis and dermis were reaproximated with deep sutures following hemostasis. Care was taken to realign the vermilion border before proceeding with the superficial closure.  Once the vermilion was realigned the superfical and mucosal closure was finished.
Ftsg Text: The defect edges were debeveled with a #15 scalpel blade.  Given the location of the defect, shape of the defect and the proximity to free margins a full thickness skin graft was deemed most appropriate.  Using a sterile surgical marker, the primary defect shape was transferred to the donor site. The area thus outlined was incised deep to adipose tissue with a #15 scalpel blade.  The harvested graft was then trimmed of adipose tissue until only dermis and epidermis was left.  The skin margins of the secondary defect were undermined to an appropriate distance in all directions utilizing iris scissors.  The secondary defect was closed with interrupted buried subcutaneous sutures.  The skin edges were then re-apposed with running  sutures.  The skin graft was then placed in the primary defect and oriented appropriately.
Split-Thickness Skin Graft Text: The defect edges were debeveled with a #15 scalpel blade.  Given the location of the defect, shape of the defect and the proximity to free margins a split thickness skin graft was deemed most appropriate.  Using a sterile surgical marker, the primary defect shape was transferred to the donor site. The split thickness graft was then harvested.  The skin graft was then placed in the primary defect and oriented appropriately.
Burow's Graft Text: The defect edges were debeveled with a #15 scalpel blade.  Given the location of the defect, shape of the defect, the proximity to free margins and the presence of a standing cone deformity a Burow's skin graft was deemed most appropriate. The standing cone was removed and this tissue was then trimmed to the shape of the primary defect. The adipose tissue was also removed until only dermis and epidermis were left.  The skin margins of the secondary defect were undermined to an appropriate distance in all directions utilizing iris scissors.  The secondary defect was closed with interrupted buried subcutaneous sutures.  The skin edges were then re-apposed with running  sutures.  The skin graft was then placed in the primary defect and oriented appropriately.
Cartilage Graft Text: The defect edges were debeveled with a #15 scalpel blade.  Given the location of the defect, shape of the defect, the fact the defect involved a full thickness cartilage defect a cartilage graft was deemed most appropriate.  An appropriate donor site was identified, cleansed, and anesthetized. The cartilage graft was then harvested and transferred to the recipient site, oriented appropriately and then sutured into place.  The secondary defect was then repaired using a primary closure.
Composite Graft Text: The defect edges were debeveled with a #15 scalpel blade.  Given the location of the defect, shape of the defect, the proximity to free margins and the fact the defect was full thickness a composite graft was deemed most appropriate.  The defect was outline and then transferred to the donor site.  A full thickness graft was then excised from the donor site. The graft was then placed in the primary defect, oriented appropriately and then sutured into place.  The secondary defect was then repaired using a primary closure.
Epidermal Autograft Text: The defect edges were debeveled with a #15 scalpel blade.  Given the location of the defect, shape of the defect and the proximity to free margins an epidermal autograft was deemed most appropriate.  Using a sterile surgical marker, the primary defect shape was transferred to the donor site. The epidermal graft was then harvested.  The skin graft was then placed in the primary defect and oriented appropriately.
Dermal Autograft Text: The defect edges were debeveled with a #15 scalpel blade.  Given the location of the defect, shape of the defect and the proximity to free margins a dermal autograft was deemed most appropriate.  Using a sterile surgical marker, the primary defect shape was transferred to the donor site. The area thus outlined was incised deep to adipose tissue with a #15 scalpel blade.  The harvested graft was then trimmed of adipose and epidermal tissue until only dermis was left.  The skin graft was then placed in the primary defect and oriented appropriately.
Skin Substitute Text: The defect edges were debeveled with a #15 scalpel blade.  Given the location of the defect, shape of the defect and the proximity to free margins a skin substitute graft was deemed most appropriate.  The graft material was trimmed to fit the size of the defect. The graft was then placed in the primary defect and oriented appropriately.
Tissue Cultured Epidermal Autograft Text: The defect edges were debeveled with a #15 scalpel blade.  Given the location of the defect, shape of the defect and the proximity to free margins a tissue cultured epidermal autograft was deemed most appropriate.  The graft was then trimmed to fit the size of the defect.  The graft was then placed in the primary defect and oriented appropriately.
Xenograft Text: The defect edges were debeveled with a #15 scalpel blade.  Given the location of the defect, shape of the defect and the proximity to free margins a xenograft was deemed most appropriate.  The graft was then trimmed to fit the size of the defect.  The graft was then placed in the primary defect and oriented appropriately.
Purse String (Intermediate) Text: Given the location of the defect and the characteristics of the surrounding skin a purse string intermediate closure was deemed most appropriate.  Undermining was performed circumferentially around the surgical defect.  A purse string suture was then placed and tightened.
Purse String (Simple) Text: Given the location of the defect and the characteristics of the surrounding skin a purse string simple closure was deemed most appropriate.  Undermining was performed circumferentially around the surgical defect.  A purse string suture was then placed and tightened.
Complex Repair And Single Advancement Flap Text: The defect edges were debeveled with a #15 scalpel blade.  The primary defect was closed partially with a complex linear closure.  Given the location of the remaining defect, shape of the defect and the proximity to free margins a single advancement flap was deemed most appropriate for complete closure of the defect.  Using a sterile surgical marker, an appropriate advancement flap was drawn incorporating the defect and placing the expected incisions within the relaxed skin tension lines where possible.    The area thus outlined was incised deep to adipose tissue with a #15 scalpel blade.  The skin margins were undermined to an appropriate distance in all directions utilizing iris scissors.
Complex Repair And Double Advancement Flap Text: The defect edges were debeveled with a #15 scalpel blade.  The primary defect was closed partially with a complex linear closure.  Given the location of the remaining defect, shape of the defect and the proximity to free margins a double advancement flap was deemed most appropriate for complete closure of the defect.  Using a sterile surgical marker, an appropriate advancement flap was drawn incorporating the defect and placing the expected incisions within the relaxed skin tension lines where possible.    The area thus outlined was incised deep to adipose tissue with a #15 scalpel blade.  The skin margins were undermined to an appropriate distance in all directions utilizing iris scissors.
Complex Repair And Modified Advancement Flap Text: The defect edges were debeveled with a #15 scalpel blade.  The primary defect was closed partially with a complex linear closure.  Given the location of the remaining defect, shape of the defect and the proximity to free margins a modified advancement flap was deemed most appropriate for complete closure of the defect.  Using a sterile surgical marker, an appropriate advancement flap was drawn incorporating the defect and placing the expected incisions within the relaxed skin tension lines where possible.    The area thus outlined was incised deep to adipose tissue with a #15 scalpel blade.  The skin margins were undermined to an appropriate distance in all directions utilizing iris scissors.
Complex Repair And A-T Advancement Flap Text: The defect edges were debeveled with a #15 scalpel blade.  The primary defect was closed partially with a complex linear closure.  Given the location of the remaining defect, shape of the defect and the proximity to free margins an A-T advancement flap was deemed most appropriate for complete closure of the defect.  Using a sterile surgical marker, an appropriate advancement flap was drawn incorporating the defect and placing the expected incisions within the relaxed skin tension lines where possible.    The area thus outlined was incised deep to adipose tissue with a #15 scalpel blade.  The skin margins were undermined to an appropriate distance in all directions utilizing iris scissors.
Complex Repair And O-T Advancement Flap Text: The defect edges were debeveled with a #15 scalpel blade.  The primary defect was closed partially with a complex linear closure.  Given the location of the remaining defect, shape of the defect and the proximity to free margins an O-T advancement flap was deemed most appropriate for complete closure of the defect.  Using a sterile surgical marker, an appropriate advancement flap was drawn incorporating the defect and placing the expected incisions within the relaxed skin tension lines where possible.    The area thus outlined was incised deep to adipose tissue with a #15 scalpel blade.  The skin margins were undermined to an appropriate distance in all directions utilizing iris scissors.
Complex Repair And O-L Flap Text: The defect edges were debeveled with a #15 scalpel blade.  The primary defect was closed partially with a complex linear closure.  Given the location of the remaining defect, shape of the defect and the proximity to free margins an O-L flap was deemed most appropriate for complete closure of the defect.  Using a sterile surgical marker, an appropriate flap was drawn incorporating the defect and placing the expected incisions within the relaxed skin tension lines where possible.    The area thus outlined was incised deep to adipose tissue with a #15 scalpel blade.  The skin margins were undermined to an appropriate distance in all directions utilizing iris scissors.
Complex Repair And Bilobe Flap Text: The defect edges were debeveled with a #15 scalpel blade.  The primary defect was closed partially with a complex linear closure.  Given the location of the remaining defect, shape of the defect and the proximity to free margins a bilobe flap was deemed most appropriate for complete closure of the defect.  Using a sterile surgical marker, an appropriate advancement flap was drawn incorporating the defect and placing the expected incisions within the relaxed skin tension lines where possible.    The area thus outlined was incised deep to adipose tissue with a #15 scalpel blade.  The skin margins were undermined to an appropriate distance in all directions utilizing iris scissors.
Complex Repair And Melolabial Flap Text: The defect edges were debeveled with a #15 scalpel blade.  The primary defect was closed partially with a complex linear closure.  Given the location of the remaining defect, shape of the defect and the proximity to free margins a melolabial flap was deemed most appropriate for complete closure of the defect.  Using a sterile surgical marker, an appropriate advancement flap was drawn incorporating the defect and placing the expected incisions within the relaxed skin tension lines where possible.    The area thus outlined was incised deep to adipose tissue with a #15 scalpel blade.  The skin margins were undermined to an appropriate distance in all directions utilizing iris scissors.
Complex Repair And Rotation Flap Text: The defect edges were debeveled with a #15 scalpel blade.  The primary defect was closed partially with a complex linear closure.  Given the location of the remaining defect, shape of the defect and the proximity to free margins a rotation flap was deemed most appropriate for complete closure of the defect.  Using a sterile surgical marker, an appropriate advancement flap was drawn incorporating the defect and placing the expected incisions within the relaxed skin tension lines where possible.    The area thus outlined was incised deep to adipose tissue with a #15 scalpel blade.  The skin margins were undermined to an appropriate distance in all directions utilizing iris scissors.
Complex Repair And Rhombic Flap Text: The defect edges were debeveled with a #15 scalpel blade.  The primary defect was closed partially with a complex linear closure.  Given the location of the remaining defect, shape of the defect and the proximity to free margins a rhombic flap was deemed most appropriate for complete closure of the defect.  Using a sterile surgical marker, an appropriate advancement flap was drawn incorporating the defect and placing the expected incisions within the relaxed skin tension lines where possible.    The area thus outlined was incised deep to adipose tissue with a #15 scalpel blade.  The skin margins were undermined to an appropriate distance in all directions utilizing iris scissors.
Complex Repair And Transposition Flap Text: The defect edges were debeveled with a #15 scalpel blade.  The primary defect was closed partially with a complex linear closure.  Given the location of the remaining defect, shape of the defect and the proximity to free margins a transposition flap was deemed most appropriate for complete closure of the defect.  Using a sterile surgical marker, an appropriate advancement flap was drawn incorporating the defect and placing the expected incisions within the relaxed skin tension lines where possible.    The area thus outlined was incised deep to adipose tissue with a #15 scalpel blade.  The skin margins were undermined to an appropriate distance in all directions utilizing iris scissors.
Complex Repair And V-Y Plasty Text: The defect edges were debeveled with a #15 scalpel blade.  The primary defect was closed partially with a complex linear closure.  Given the location of the remaining defect, shape of the defect and the proximity to free margins a V-Y plasty was deemed most appropriate for complete closure of the defect.  Using a sterile surgical marker, an appropriate advancement flap was drawn incorporating the defect and placing the expected incisions within the relaxed skin tension lines where possible.    The area thus outlined was incised deep to adipose tissue with a #15 scalpel blade.  The skin margins were undermined to an appropriate distance in all directions utilizing iris scissors.
Complex Repair And M Plasty Text: The defect edges were debeveled with a #15 scalpel blade.  The primary defect was closed partially with a complex linear closure.  Given the location of the remaining defect, shape of the defect and the proximity to free margins an M plasty was deemed most appropriate for complete closure of the defect.  Using a sterile surgical marker, an appropriate advancement flap was drawn incorporating the defect and placing the expected incisions within the relaxed skin tension lines where possible.    The area thus outlined was incised deep to adipose tissue with a #15 scalpel blade.  The skin margins were undermined to an appropriate distance in all directions utilizing iris scissors.
Complex Repair And Double M Plasty Text: The defect edges were debeveled with a #15 scalpel blade.  The primary defect was closed partially with a complex linear closure.  Given the location of the remaining defect, shape of the defect and the proximity to free margins a double M plasty was deemed most appropriate for complete closure of the defect.  Using a sterile surgical marker, an appropriate advancement flap was drawn incorporating the defect and placing the expected incisions within the relaxed skin tension lines where possible.    The area thus outlined was incised deep to adipose tissue with a #15 scalpel blade.  The skin margins were undermined to an appropriate distance in all directions utilizing iris scissors.
Complex Repair And W Plasty Text: The defect edges were debeveled with a #15 scalpel blade.  The primary defect was closed partially with a complex linear closure.  Given the location of the remaining defect, shape of the defect and the proximity to free margins a W plasty was deemed most appropriate for complete closure of the defect.  Using a sterile surgical marker, an appropriate advancement flap was drawn incorporating the defect and placing the expected incisions within the relaxed skin tension lines where possible.    The area thus outlined was incised deep to adipose tissue with a #15 scalpel blade.  The skin margins were undermined to an appropriate distance in all directions utilizing iris scissors.
Complex Repair And Z Plasty Text: The defect edges were debeveled with a #15 scalpel blade.  The primary defect was closed partially with a complex linear closure.  Given the location of the remaining defect, shape of the defect and the proximity to free margins a Z plasty was deemed most appropriate for complete closure of the defect.  Using a sterile surgical marker, an appropriate advancement flap was drawn incorporating the defect and placing the expected incisions within the relaxed skin tension lines where possible.    The area thus outlined was incised deep to adipose tissue with a #15 scalpel blade.  The skin margins were undermined to an appropriate distance in all directions utilizing iris scissors.
Complex Repair And Dorsal Nasal Flap Text: The defect edges were debeveled with a #15 scalpel blade.  The primary defect was closed partially with a complex linear closure.  Given the location of the remaining defect, shape of the defect and the proximity to free margins a dorsal nasal flap was deemed most appropriate for complete closure of the defect.  Using a sterile surgical marker, an appropriate flap was drawn incorporating the defect and placing the expected incisions within the relaxed skin tension lines where possible.    The area thus outlined was incised deep to adipose tissue with a #15 scalpel blade.  The skin margins were undermined to an appropriate distance in all directions utilizing iris scissors.
Complex Repair And Ftsg Text: The defect edges were debeveled with a #15 scalpel blade.  The primary defect was closed partially with a complex linear closure.  Given the location of the defect, shape of the defect and the proximity to free margins a full thickness skin graft was deemed most appropriate to repair the remaining defect.  The graft was trimmed to fit the size of the remaining defect.  The graft was then placed in the primary defect, oriented appropriately, and sutured into place.
Complex Repair And Burow's Graft Text: The defect edges were debeveled with a #15 scalpel blade.  The primary defect was closed partially with a complex linear closure.  Given the location of the defect, shape of the defect, the proximity to free margins and the presence of a standing cone deformity a Burow's graft was deemed most appropriate to repair the remaining defect.  The graft was trimmed to fit the size of the remaining defect.  The graft was then placed in the primary defect, oriented appropriately, and sutured into place.
Complex Repair And Split-Thickness Skin Graft Text: The defect edges were debeveled with a #15 scalpel blade.  The primary defect was closed partially with a complex linear closure.  Given the location of the defect, shape of the defect and the proximity to free margins a split thickness skin graft was deemed most appropriate to repair the remaining defect.  The graft was trimmed to fit the size of the remaining defect.  The graft was then placed in the primary defect, oriented appropriately, and sutured into place.
Complex Repair And Epidermal Autograft Text: The defect edges were debeveled with a #15 scalpel blade.  The primary defect was closed partially with a complex linear closure.  Given the location of the defect, shape of the defect and the proximity to free margins an epidermal autograft was deemed most appropriate to repair the remaining defect.  The graft was trimmed to fit the size of the remaining defect.  The graft was then placed in the primary defect, oriented appropriately, and sutured into place.
Complex Repair And Dermal Autograft Text: The defect edges were debeveled with a #15 scalpel blade.  The primary defect was closed partially with a complex linear closure.  Given the location of the defect, shape of the defect and the proximity to free margins an dermal autograft was deemed most appropriate to repair the remaining defect.  The graft was trimmed to fit the size of the remaining defect.  The graft was then placed in the primary defect, oriented appropriately, and sutured into place.
Complex Repair And Tissue Cultured Epidermal Autograft Text: The defect edges were debeveled with a #15 scalpel blade.  The primary defect was closed partially with a complex linear closure.  Given the location of the defect, shape of the defect and the proximity to free margins an tissue cultured epidermal autograft was deemed most appropriate to repair the remaining defect.  The graft was trimmed to fit the size of the remaining defect.  The graft was then placed in the primary defect, oriented appropriately, and sutured into place.
Complex Repair And Xenograft Text: The defect edges were debeveled with a #15 scalpel blade.  The primary defect was closed partially with a complex linear closure.  Given the location of the defect, shape of the defect and the proximity to free margins a xenograft was deemed most appropriate to repair the remaining defect.  The graft was trimmed to fit the size of the remaining defect.  The graft was then placed in the primary defect, oriented appropriately, and sutured into place.
Complex Repair And Skin Substitute Graft Text: The defect edges were debeveled with a #15 scalpel blade.  The primary defect was closed partially with a complex linear closure.  Given the location of the remaining defect, shape of the defect and the proximity to free margins a skin substitute graft was deemed most appropriate to repair the remaining defect.  The graft was trimmed to fit the size of the remaining defect.  The graft was then placed in the primary defect, oriented appropriately, and sutured into place.
Consent was obtained from the patient. The risks and benefits to therapy were discussed in detail. Specifically, the risks of infection, scarring, bleeding, prolonged wound healing, incomplete removal, allergy to anesthesia, nerve injury and recurrence were addressed. Prior to the procedure, the treatment site was clearly identified and confirmed by the patient. All components of Universal Protocol/PAUSE Rule completed.
Render Post-Care Instructions In Note?: yes
Post-Care Instructions: I reviewed with the patient in detail post-care instructions. Patient is not to engage in any heavy lifting, exercise, or swimming for the next 14 days. Should the patient develop any fevers, chills, bleeding, severe pain patient will contact the office immediately.
Home Suture Removal Text: Patient was provided a home suture removal kit and will remove their sutures at home.  If they have any questions or difficulties they will call the office.
Where Do You Want The Question To Include Opioid Counseling Located?: Case Summary Tab
Billing Type: Third-Party Bill
Information: Selecting Yes will display possible errors in your note based on the variables you have selected. This validation is only offered as a suggestion for you. PLEASE NOTE THAT THE VALIDATION TEXT WILL BE REMOVED WHEN YOU FINALIZE YOUR NOTE. IF YOU WANT TO FAX A PRELIMINARY NOTE YOU WILL NEED TO TOGGLE THIS TO 'NO' IF YOU DO NOT WANT IT IN YOUR FAXED NOTE.

## 2022-05-03 NOTE — PROCEDURE: ADDITIONAL NOTES
Detail Level: Simple
Render Risk Assessment In Note?: no
Additional Notes: Present for years, history of pain and tenderness

## 2022-05-18 ENCOUNTER — APPOINTMENT (RX ONLY)
Dept: URBAN - METROPOLITAN AREA CLINIC 34 | Facility: CLINIC | Age: 32
Setting detail: DERMATOLOGY
End: 2022-05-18

## 2022-05-18 DIAGNOSIS — Z48.02 ENCOUNTER FOR REMOVAL OF SUTURES: ICD-10-CM

## 2022-05-18 PROCEDURE — 99024 POSTOP FOLLOW-UP VISIT: CPT

## 2022-05-18 PROCEDURE — ? SUTURE REMOVAL (GLOBAL PERIOD)

## 2022-05-18 ASSESSMENT — LOCATION ZONE DERM: LOCATION ZONE: ARM

## 2022-05-18 ASSESSMENT — LOCATION DETAILED DESCRIPTION DERM: LOCATION DETAILED: RIGHT POSTERIOR SHOULDER

## 2022-05-18 ASSESSMENT — LOCATION SIMPLE DESCRIPTION DERM: LOCATION SIMPLE: RIGHT SHOULDER

## 2022-05-18 NOTE — PROCEDURE: SUTURE REMOVAL (GLOBAL PERIOD)
Detail Level: Detailed
Add 26733 Cpt? (Important Note: In 2017 The Use Of 41647 Is Being Tracked By Cms To Determine Future Global Period Reimbursement For Global Periods): yes

## 2024-02-27 NOTE — PROCEDURE: ADDITIONAL NOTES
show
Render Risk Assessment In Note?: no
Detail Level: Simple
Additional Notes: Patient states acne started about four years ago. Advised patient acne appears hormonal. \\n\\nWill treat topically, and with spironolactone given that breakouts appear around menstrual cycle.
Additional Notes: History of childhood eczema. PIH present today. Patient admits to scratching affected areas.
Additional Notes: Present for years, history of pain and tenderness